# Patient Record
Sex: FEMALE | Race: WHITE | URBAN - METROPOLITAN AREA
[De-identification: names, ages, dates, MRNs, and addresses within clinical notes are randomized per-mention and may not be internally consistent; named-entity substitution may affect disease eponyms.]

---

## 2019-06-04 ENCOUNTER — OFFICE VISIT (OUTPATIENT)
Dept: INTERNAL MEDICINE CLINIC | Age: 79
End: 2019-06-04

## 2019-06-04 ENCOUNTER — HOSPITAL ENCOUNTER (OUTPATIENT)
Dept: LAB | Age: 79
Discharge: HOME | End: 2019-06-04
Payer: MEDICARE

## 2019-06-04 VITALS
DIASTOLIC BLOOD PRESSURE: 74 MMHG | SYSTOLIC BLOOD PRESSURE: 161 MMHG | TEMPERATURE: 98.5 F | OXYGEN SATURATION: 96 % | RESPIRATION RATE: 18 BRPM | HEART RATE: 58 BPM | WEIGHT: 120 LBS | HEIGHT: 61 IN | BODY MASS INDEX: 22.66 KG/M2

## 2019-06-04 DIAGNOSIS — C34.12 PRIMARY CANCER OF LEFT UPPER LOBE OF LUNG (HCC): ICD-10-CM

## 2019-06-04 DIAGNOSIS — M35.3 POLYMYALGIA RHEUMATICA (HCC): ICD-10-CM

## 2019-06-04 DIAGNOSIS — R79.89 ELEVATED LIVER FUNCTION TESTS: ICD-10-CM

## 2019-06-04 DIAGNOSIS — M81.0 AGE-RELATED OSTEOPOROSIS WITHOUT CURRENT PATHOLOGICAL FRACTURE: ICD-10-CM

## 2019-06-04 DIAGNOSIS — R79.89 ELEVATED LIVER FUNCTION TESTS: Primary | ICD-10-CM

## 2019-06-04 DIAGNOSIS — J45.20 MILD INTERMITTENT ASTHMA, UNSPECIFIED WHETHER COMPLICATED: ICD-10-CM

## 2019-06-04 DIAGNOSIS — J01.10 ACUTE NON-RECURRENT FRONTAL SINUSITIS: ICD-10-CM

## 2019-06-04 DIAGNOSIS — H40.1131 PRIMARY OPEN ANGLE GLAUCOMA (POAG) OF BOTH EYES, MILD STAGE: ICD-10-CM

## 2019-06-04 DIAGNOSIS — F33.1 MODERATE EPISODE OF RECURRENT MAJOR DEPRESSIVE DISORDER (HCC): ICD-10-CM

## 2019-06-04 DIAGNOSIS — I48.21 PERMANENT ATRIAL FIBRILLATION (HCC): ICD-10-CM

## 2019-06-04 PROBLEM — J45.909 ASTHMA IN ADULT: Status: ACTIVE | Noted: 2017-07-28

## 2019-06-04 PROBLEM — I48.91 A-FIB (HCC): Status: ACTIVE | Noted: 2017-07-28

## 2019-06-04 PROBLEM — N95.2 ATROPHIC VULVOVAGINITIS: Status: ACTIVE | Noted: 2017-08-29

## 2019-06-04 PROBLEM — K58.2 IRRITABLE BOWEL SYNDROME WITH BOTH CONSTIPATION AND DIARRHEA: Status: ACTIVE | Noted: 2017-07-28

## 2019-06-04 PROBLEM — I10 ESSENTIAL HYPERTENSION: Status: ACTIVE | Noted: 2019-06-04

## 2019-06-04 LAB
ALBUMIN SERPL-MCNC: 4.1 G/DL (ref 3.4–5)
ALBUMIN/GLOB SERPL: 1.3 {RATIO} (ref 0.8–1.7)
ALP SERPL-CCNC: 49 U/L (ref 45–117)
ALT SERPL-CCNC: 25 U/L (ref 13–56)
ANION GAP SERPL CALC-SCNC: 8 MMOL/L (ref 3–18)
APPEARANCE UR: CLEAR
AST SERPL-CCNC: 19 U/L (ref 15–37)
BACTERIA URNS QL MICRO: NEGATIVE /HPF
BILIRUB SERPL-MCNC: 0.4 MG/DL (ref 0.2–1)
BILIRUB UR QL: NEGATIVE
BUN SERPL-MCNC: 23 MG/DL (ref 7–18)
BUN/CREAT SERPL: 26 (ref 12–20)
CALCIUM SERPL-MCNC: 10.3 MG/DL (ref 8.5–10.1)
CHLORIDE SERPL-SCNC: 96 MMOL/L (ref 100–108)
CO2 SERPL-SCNC: 29 MMOL/L (ref 21–32)
COLOR UR: YELLOW
CREAT SERPL-MCNC: 0.88 MG/DL (ref 0.6–1.3)
EPITH CASTS URNS QL MICRO: ABNORMAL /LPF (ref 0–5)
FERRITIN SERPL-MCNC: 79 NG/ML (ref 8–388)
GLOBULIN SER CALC-MCNC: 3.1 G/DL (ref 2–4)
GLUCOSE SERPL-MCNC: 106 MG/DL (ref 74–99)
GLUCOSE UR STRIP.AUTO-MCNC: NEGATIVE MG/DL
HGB UR QL STRIP: ABNORMAL
IRON SATN MFR SERPL: 17 %
IRON SERPL-MCNC: 59 UG/DL (ref 50–175)
KETONES UR QL STRIP.AUTO: NEGATIVE MG/DL
LEUKOCYTE ESTERASE UR QL STRIP.AUTO: NEGATIVE
NITRITE UR QL STRIP.AUTO: NEGATIVE
PH UR STRIP: 5.5 [PH] (ref 5–8)
POTASSIUM SERPL-SCNC: 4.7 MMOL/L (ref 3.5–5.5)
PROT SERPL-MCNC: 7.2 G/DL (ref 6.4–8.2)
PROT UR STRIP-MCNC: NEGATIVE MG/DL
RBC #/AREA URNS HPF: ABNORMAL /HPF (ref 0–5)
SODIUM SERPL-SCNC: 133 MMOL/L (ref 136–145)
SP GR UR REFRACTOMETRY: 1.02 (ref 1–1.03)
TIBC SERPL-MCNC: 339 UG/DL (ref 250–450)
UROBILINOGEN UR QL STRIP.AUTO: 0.2 EU/DL (ref 0.2–1)
WBC URNS QL MICRO: ABNORMAL /HPF (ref 0–4)

## 2019-06-04 PROCEDURE — 83540 ASSAY OF IRON: CPT

## 2019-06-04 PROCEDURE — 82728 ASSAY OF FERRITIN: CPT

## 2019-06-04 PROCEDURE — 80053 COMPREHEN METABOLIC PANEL: CPT

## 2019-06-04 PROCEDURE — 36415 COLL VENOUS BLD VENIPUNCTURE: CPT

## 2019-06-04 PROCEDURE — 86803 HEPATITIS C AB TEST: CPT

## 2019-06-04 PROCEDURE — 81001 URINALYSIS AUTO W/SCOPE: CPT

## 2019-06-04 RX ORDER — TRAZODONE HYDROCHLORIDE 100 MG/1
100 TABLET ORAL
COMMUNITY
End: 2019-06-04 | Stop reason: SDUPTHER

## 2019-06-04 RX ORDER — MONTELUKAST SODIUM 10 MG/1
1 TABLET ORAL DAILY
COMMUNITY
Start: 2016-02-28

## 2019-06-04 RX ORDER — PREDNISONE 5 MG/1
TABLET ORAL
COMMUNITY
End: 2019-07-02 | Stop reason: DRUGHIGH

## 2019-06-04 RX ORDER — GLUCOSAMINE SULFATE 1500 MG
POWDER IN PACKET (EA) ORAL DAILY
COMMUNITY

## 2019-06-04 RX ORDER — LATANOPROST 50 UG/ML
1 SOLUTION/ DROPS OPHTHALMIC DAILY
COMMUNITY
Start: 2019-06-02

## 2019-06-04 RX ORDER — TRAZODONE HYDROCHLORIDE 100 MG/1
1 TABLET ORAL
COMMUNITY

## 2019-06-04 RX ORDER — CEFUROXIME AXETIL 500 MG/1
500 TABLET ORAL 2 TIMES DAILY
Qty: 14 TAB | Refills: 0 | Status: SHIPPED | OUTPATIENT
Start: 2019-06-04 | End: 2019-07-02

## 2019-06-04 RX ORDER — LORAZEPAM 0.5 MG/1
1 TABLET ORAL
COMMUNITY
End: 2019-08-12 | Stop reason: SDUPTHER

## 2019-06-04 RX ORDER — ZOLPIDEM TARTRATE 5 MG/1
1 TABLET ORAL
COMMUNITY
Start: 2019-04-25 | End: 2019-07-02 | Stop reason: DRUGHIGH

## 2019-06-04 RX ORDER — PREDNISONE 10 MG/1
TABLET ORAL
COMMUNITY
End: 2019-07-02 | Stop reason: DRUGHIGH

## 2019-06-04 RX ORDER — DILTIAZEM HYDROCHLORIDE 120 MG/1
1 CAPSULE, COATED, EXTENDED RELEASE ORAL
COMMUNITY
Start: 2019-05-10

## 2019-06-04 NOTE — PROGRESS NOTES
Chief Complaint   Patient presents with    Irregular Heart Beat     followed by     Sinus Infection     with yellow mucus     1. Have you been to the ER, urgent care clinic since your last visit? Hospitalized since your last visit? No    2. Have you seen or consulted any other health care providers outside of the [address] [place] [address] since your last visit? Include any pap smears or colon screening.  No

## 2019-06-04 NOTE — PROGRESS NOTES
HPI:     This patient presents to the office for transfer of medical care with extensive Epic records reviewed during this encounter. Information verified and transferred into local database. Her medications were reconciled. She has several outstanding issues:    1. Headaches with expectoration of yellow secretions in the absence of fever or chills. She has taken Tylenol and Aleve without relief. 2. PAF which is being managed by Dr. Keanu Dalton with trial of several anti-arrhythmic agents with intolerance. She is currently on Multaq and Xarelto. She is being referred to Dr. Alex Ch for catheter ablation. She is not aware of any palpitations. 3. Labile blood pressure control and has Cardizem for prn use as her BP has a tendency to drop dramatically    4. Polymyalgia rheumatica diagnosed by Dr. Ladonna Tran who has her on Prednisone 15 mg daily. She is not on ulcer prophylaxis. 5. Osteoporosis for which she is on IV Reclast and D3 supplements with last DEXA not on file. 6. Asthma which has been managed by Dr. Dilip Dias for which she is on Singulair and Proventil    7. Insomnia for which she is on Trazodone which has interaction with her anti-arrhythmic agent (QT prolongation    8. Elevated liver enzymes documented in October 2018 but not addressed and predated the use of Multaq. She had been drinking regularly at that time but has cut back considerably. Remote CT abdomen in 2015 reveled normal liver and gallbladder    9. 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 Sahil Hilliard and negative follow up CT July 2018    10. Microhematuria with prior negative CTU and cystoscopy    11. Eosinophilic esophagitis diagnosed in 2015 without records available for review    12. Lymphocytic colitis currently not under treatment    13.  Glaucoma    Past Medical History:   Diagnosis Date    Atrial fibrillation ([facility] [address]     Elevated liver function tests 6/4/2019    Essential hypertension 6/4/2019    Insomnia     Polymyalgia rheumatica (HCC)      Past Surgical History:   Procedure Laterality Date    HX ACL RECONSTRUCTION Left     HX BLADDER SUSPENSION      HX CATARACT REMOVAL      HX HYSTERECTOMY      HX OTHER SURGICAL      endoscopic sinus surgery    HX ROTATOR CUFF REPAIR Right     HX WISDOM TEETH EXTRACTION       Current Outpatient Medications   Medication Sig    dronedarone (MULTAQ) tab tablet Take 200 mg by mouth two (2) times daily (with meals).  cholecalciferol (VITAMIN D3) 1,000 unit cap Take  by mouth daily.  predniSONE (DELTASONE) 5 mg tablet Take  by mouth.  predniSONE (DELTASONE) 10 mg tablet Take  by mouth daily (with breakfast).  montelukast (SINGULAIR) 10 mg tablet Take 1 Tab by mouth daily.  dilTIAZem CD (CARDIZEM CD) 120 mg ER capsule Take 1 Cap by mouth daily as needed. palpitations    rivaroxaban (XARELTO) 20 mg tab tablet Take 1 Tab by mouth daily.  cefUROXime (CEFTIN) 500 mg tablet Take 1 Tab by mouth two (2) times a day.  traZODone (DESYREL) 100 mg tablet Take 1 Tab by mouth nightly.  zolpidem (AMBIEN) 5 mg tablet Take 1 Tab by mouth nightly as needed.  LORazepam (ATIVAN) 0.5 mg tablet Take 1 Tab by mouth nightly as needed.  latanoprost (XALATAN) 0.005 % ophthalmic solution Administer 1 Drop to both eyes daily.  albuterol sulfate (PROAIR RESPICLICK) 90 mcg/actuation aepb Take 2 Puffs by inhalation every four to six (4-6) hours as needed. No current facility-administered medications for this visit. Allergies and Intolerances:    Allergies   Allergen Reactions    Doxycycline Hives    Codeine Nausea and Vomiting and Hives     Can use tussionex  Only allergic to the syrup      Timolol Hives     Family History:   Family History   Problem Relation Age of Onset    Cancer Mother     Colon Cancer Mother     Stroke Father     Diabetes Father     Cancer Sister     Stroke Brother     Diabetes Brother     Parkinson's Disease Brother     No Known Problems Brother      Social History:   She  reports that she has never smoked. She has never used smokeless tobacco.   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 Negative for chills and fever. HENT: Negative for hearing loss. Eyes: Negative for blurred vision. Respiratory: Negative for shortness of breath and wheezing. Cardiovascular: Negative for leg swelling. Gastrointestinal: Negative for heartburn. Genitourinary: Negative for dysuria. Neurological: Positive for headaches. Psychiatric/Behavioral: The patient has insomnia. Physical:   Visit Vitals  /74 (BP 1 Location: Left arm, BP Patient Position: Sitting)   Pulse (!) 58   Temp 98.5 °F (36.9 °C) (Oral)   Resp 18   Ht 5' 4\" (1.626 m)   Wt 120 lb (54.4 kg)   SpO2 96%   BMI 20.60 kg/m²          Physical Exam   Constitutional: She is well-developed, well-nourished, and in no distress. Pleasant soft spoken lady with delicacy of manners   HENT:   Right Ear: External ear normal.   Left Ear: External ear normal.   Mouth/Throat: Oropharynx is clear and moist.   Eyes: Conjunctivae are normal. No scleral icterus. Neck: No JVD present. Cardiovascular: Normal rate, regular rhythm, normal heart sounds and intact distal pulses. Exam reveals no gallop. No murmur heard. Pulmonary/Chest: Breath sounds normal. She has no wheezes. She has no rales. Abdominal: Soft. Bowel sounds are normal. She exhibits no mass. There is no tenderness. Musculoskeletal: She exhibits no edema. Lymphadenopathy:     She has no cervical adenopathy. Neurological: She is alert. She displays no tremor. Gait normal.   Skin: No cyanosis. Nails show no clubbing. Psychiatric: Affect normal.   Vitals reviewed. Review of Data:  Labs:  No visits with results within 3 Month(s) from this visit. Latest known visit with results is:   No results found for any previous visit.      Impression/Plan:   Patient Active Problem List   Diagnosis  Code    PAF, non-valvular, with intolerance to anti-arrhythmic Defer management to Dr. Marissa Maria and alert him to abnormal liver function as Multaq can lead to DILI (drug induced liver injury)   I48.91    Hypertension with labile control Monitor BP trend at home, consider change from Trazodone to Lexapro   I10    PMR on chronic steroid Steroid taper per Dr. Doreen Del Castillo and recommend Omeprazole 20mg daily for ulcer prophylaxis   M35.3    Elevated liver enzymes with concern for drug mediated effect Repeat liver panel with hepatitis serology, transferrin saturation ordered, imaging if LFT persistently elevated   R94.5    Hyeprlipidemia Determine reason she is not on statin, repeat FLP and calculate ACC/AHA risk score   E78,5    NSCLC Stage 1A s/p thorascopic wedge resection   Surveillance per Dr. Darvin Pearson C34.12    Age-related osteoporosis without current pathological fracture   Management per Dr. Doreen Del Castillo M81.0    Allergic rhinosinusitis with recent flare Ceftin 500 mg bid x 7 days, Saline NS   J30.9    Asthma in adult, stable Continue current regimen   J45.909    Glaucoma Management per Ophthalmology   H40.9    Lymphocytic Colitis Determine from GI if treatment recommended   G69.8    Eosinophilic esophagitis Determine from GI if treatment recommended (ICS) or food allergy testing   I48.91    Primary insomnia Consider alternative to Trazodone F51.01       Jenna Shearer MD

## 2019-06-05 LAB
HCV AB SER IA-ACNC: <0.02 INDEX
HCV AB SERPL QL IA: NEGATIVE
HCV COMMENT,HCGAC: NORMAL

## 2019-07-02 ENCOUNTER — OFFICE VISIT (OUTPATIENT)
Dept: INTERNAL MEDICINE CLINIC | Age: 79
End: 2019-07-02

## 2019-07-02 ENCOUNTER — HOSPITAL ENCOUNTER (OUTPATIENT)
Dept: LAB | Age: 79
Discharge: HOME | End: 2019-07-02
Payer: MEDICARE

## 2019-07-02 VITALS
TEMPERATURE: 98.8 F | HEART RATE: 86 BPM | DIASTOLIC BLOOD PRESSURE: 71 MMHG | RESPIRATION RATE: 18 BRPM | WEIGHT: 121 LBS | BODY MASS INDEX: 22.84 KG/M2 | HEIGHT: 61 IN | OXYGEN SATURATION: 97 % | SYSTOLIC BLOOD PRESSURE: 128 MMHG

## 2019-07-02 DIAGNOSIS — R73.03 PREDIABETES: ICD-10-CM

## 2019-07-02 DIAGNOSIS — M31.6 GIANT CELL ARTERITIS (HCC): ICD-10-CM

## 2019-07-02 DIAGNOSIS — R73.03 PREDIABETES: Primary | ICD-10-CM

## 2019-07-02 DIAGNOSIS — E83.52 HYPERCALCEMIA: ICD-10-CM

## 2019-07-02 PROBLEM — E87.1 HYPONATREMIA: Status: ACTIVE | Noted: 2019-07-02

## 2019-07-02 LAB
ANION GAP SERPL CALC-SCNC: 6 MMOL/L (ref 3–18)
BUN SERPL-MCNC: 27 MG/DL (ref 7–18)
BUN/CREAT SERPL: 27 (ref 12–20)
CALCIUM SERPL-MCNC: 9.4 MG/DL (ref 8.5–10.1)
CALCIUM SERPL-MCNC: 9.4 MG/DL (ref 8.5–10.1)
CHLORIDE SERPL-SCNC: 97 MMOL/L (ref 100–108)
CO2 SERPL-SCNC: 30 MMOL/L (ref 21–32)
CREAT SERPL-MCNC: 1.01 MG/DL (ref 0.6–1.3)
EST. AVERAGE GLUCOSE BLD GHB EST-MCNC: 120 MG/DL
GLUCOSE SERPL-MCNC: 132 MG/DL (ref 74–99)
HBA1C MFR BLD: 5.8 % (ref 4.2–5.6)
POTASSIUM SERPL-SCNC: 5 MMOL/L (ref 3.5–5.5)
PTH-INTACT SERPL-MCNC: 57.8 PG/ML (ref 18.4–88)
SODIUM SERPL-SCNC: 133 MMOL/L (ref 136–145)

## 2019-07-02 PROCEDURE — 36415 COLL VENOUS BLD VENIPUNCTURE: CPT

## 2019-07-02 PROCEDURE — 83970 ASSAY OF PARATHORMONE: CPT

## 2019-07-02 PROCEDURE — 80048 BASIC METABOLIC PNL TOTAL CA: CPT

## 2019-07-02 PROCEDURE — 83036 HEMOGLOBIN GLYCOSYLATED A1C: CPT

## 2019-07-02 RX ORDER — ZOLPIDEM TARTRATE 12.5 MG/1
12.5 TABLET, FILM COATED, EXTENDED RELEASE ORAL
Qty: 30 TAB | Refills: 0 | Status: SHIPPED | OUTPATIENT
Start: 2019-07-02

## 2019-07-02 RX ORDER — PREDNISONE 20 MG/1
TABLET ORAL
Qty: 90 TAB | Refills: 0
Start: 2019-07-02

## 2019-07-02 RX ORDER — PHENOL/SODIUM PHENOLATE
20 AEROSOL, SPRAY (ML) MUCOUS MEMBRANE DAILY
Qty: 30 TAB | Refills: 5 | Status: SHIPPED | OUTPATIENT
Start: 2019-07-02

## 2019-07-02 NOTE — PROGRESS NOTES
HPI:     This al[place] lady had temporal artery biopsy performed in [place] with GCA diagnosis confirmed. This was arranged by Dr. Flo Escobar who has since increased her Prednisone to 60 mg daily and will be starting Actemra in an effort to get off steroid therapy. Her Quantiferon gold testing was apparently negative. She is not on ulcer prophylaxis. She also complains of problems with agitation and insomnia since starting higher dose of steroid and Ambien combine with Trazodone has only allowed 2 hours of sleep and desperately wants relief. She denies any AM hangover effect. I repeated her liver function studies which returned normal along with iron panel and Hep C screening. I do not have results of liver imaging which have been ordered to determine if she has fatty liver. I also made her aware that she had mild hyponatremia and hypercalcemia but is not on diuretic therapy. She denies use of any tea or other caffeinated beverage that could explain these results. She is not taking calcium supplements. Past Medical History:   Diagnosis Date    Atrial fibrillation ([address]     Elevated liver function tests 6/4/2019    Essential hypertension 6/4/2019    Insomnia     Polymyalgia rheumatica (HCC)      Past Surgical History:   Procedure Laterality Date    HX ACL RECONSTRUCTION Left     HX BLADDER SUSPENSION      HX CATARACT REMOVAL      HX HYSTERECTOMY      HX OTHER SURGICAL      endoscopic sinus surgery    HX ROTATOR CUFF REPAIR Right     HX WISDOM TEETH EXTRACTION       Current Outpatient Medications   Medication Sig    predniSONE (DELTASONE) 20 mg tablet 3 tabs po every day for GCA per Dr. Flo Carpenterist  Indications: Inflammation of the Artery in the [facility]    zolpidem CR (AMBIEN CR) 12.5 mg tablet Take 1 Tab by mouth nightly as needed for Sleep. Max Daily Amount: 12.5 mg.    Omeprazole delayed release (PRILOSEC D/R) 20 mg tablet Take 1 Tab by mouth daily.     cholecalciferol (VITAMIN D3) 1,000 unit cap Take  by mouth daily.  traZODone (DESYREL) 100 mg tablet Take 1 Tab by mouth nightly.  montelukast (SINGULAIR) 10 mg tablet Take 1 Tab by mouth daily.  LORazepam (ATIVAN) 0.5 mg tablet Take 1 Tab by mouth nightly as needed.  latanoprost (XALATAN) 0.005 % ophthalmic solution Administer 1 Drop to both eyes daily.  dilTIAZem CD (CARDIZEM CD) 120 mg ER capsule Take 1 Cap by mouth daily as needed. palpitations    albuterol sulfate (PROAIR RESPICLICK) 90 mcg/actuation aepb Take 2 Puffs by inhalation every four to six (4-6) hours as needed.  rivaroxaban (XARELTO) 20 mg tab tablet Take 1 Tab by mouth daily. No current facility-administered medications for this visit. Allergies and Intolerances: Allergies   Allergen Reactions    Doxycycline Hives    Codeine Nausea and Vomiting and Hives     Can use tussionex  Only allergic to the syrup      Multaq [Dronedarone] Other (comments)     Profound lowering heart rate    Timolol Hives     Family History:   Family History   Problem Relation Age of Onset    Cancer Mother     Colon Cancer Mother     Stroke Father     Diabetes Father     Cancer Sister     Stroke Brother     Diabetes Brother     Parkinson's Disease Brother     No Known Problems Brother      Social History:   She  reports that she has never smoked. She has never used smokeless tobacco.   Social History     Substance and Sexual Activity   Alcohol Use Yes    Alcohol/week: 0.6 oz    Types: 1 Cans of beer per week    Comment: corona light per day       Physical:   Visit Vitals  /71 (BP 1 Location: Left arm, BP Patient Position: Sitting)   Pulse 86   Temp 98.8 °F (37.1 °C) (Oral)   Resp 18   Ht 5' 1\" (1.549 m)   Wt 121 lb (54.9 kg)   SpO2 97%   BMI 22.86 kg/m²          Physical Exam   Constitutional: She is well-developed, well-nourished, and in no distress.    HENT:   Mouth/Throat: Oropharynx is clear and moist.   Healing incisions from bilateral temporal artery biopsies Eyes: Conjunctivae are normal. No scleral icterus. Cardiovascular: Normal rate, regular rhythm and normal heart sounds. Pulmonary/Chest: Breath sounds normal.   Abdominal: Soft. There is no tenderness. Musculoskeletal: She exhibits no edema. Lymphadenopathy:     She has no cervical adenopathy. Vitals reviewed. Review of Data:  Labs:  Hospital Outpatient Visit on 06/04/2019   Component Date Value Ref Range Status    Sodium 06/04/2019 133* 136 - 145 mmol/L Final    Potassium 06/04/2019 4.7  3.5 - 5.5 mmol/L Final    Chloride 06/04/2019 96* 100 - 108 mmol/L Final    CO2 06/04/2019 29  21 - 32 mmol/L Final    Anion gap 06/04/2019 8  3.0 - 18 mmol/L Final    Glucose 06/04/2019 106* 74 - 99 mg/dL Final    BUN 06/04/2019 23* 7.0 - 18 MG/DL Final    Creatinine 06/04/2019 0.88  0.6 - 1.3 MG/DL Final    BUN/Creatinine ratio 06/04/2019 26* 12 - 20   Final    GFR est AA 06/04/2019 >60  >60 ml/min/1.73m2 Final    GFR est non-AA 06/04/2019 >60  >60 ml/min/1.73m2 Final    Calcium 06/04/2019 10.3* 8.5 - 10.1 MG/DL Final    Bilirubin, total 06/04/2019 0.4  0.2 - 1.0 MG/DL Final    ALT (SGPT) 06/04/2019 25  13 - 56 U/L Final    AST (SGOT) 06/04/2019 19  15 - 37 U/L Final    Alk.  phosphatase 06/04/2019 49  45 - 117 U/L Final    Protein, total 06/04/2019 7.2  6.4 - 8.2 g/dL Final    Albumin 06/04/2019 4.1  3.4 - 5.0 g/dL Final    Globulin 06/04/2019 3.1  2.0 - 4.0 g/dL Final    A-G Ratio 06/04/2019 1.3  0.8 - 1.7   Final    Color 06/04/2019 YELLOW    Final    Appearance 06/04/2019 CLEAR    Final    Specific gravity 06/04/2019 1.016  1.005 - 1.030   Final    pH (UA) 06/04/2019 5.5  5.0 - 8.0   Final    Protein 06/04/2019 NEGATIVE   NEG mg/dL Final    Glucose 06/04/2019 NEGATIVE   NEG mg/dL Final    Ketone 06/04/2019 NEGATIVE   NEG mg/dL Final    Bilirubin 06/04/2019 NEGATIVE   NEG   Final    Blood 06/04/2019 MODERATE* NEG   Final    Urobilinogen 06/04/2019 0.2  0.2 - 1.0 EU/dL Final    Nitrites 06/04/2019 NEGATIVE   NEG   Final    Leukocyte Esterase 06/04/2019 NEGATIVE   NEG   Final    WBC 06/04/2019 0 to 3  0 - 4 /hpf Final    RBC 06/04/2019 2 to 4  0 - 5 /hpf Final    Epithelial cells 06/04/2019 FEW  0 - 5 /lpf Final    Bacteria 06/04/2019 NEGATIVE   NEG /hpf Final    Hepatitis C virus Ab 06/04/2019 <0.02  <0.80 Index Final    Hep C  virus Ab Interp.  06/04/2019 NEGATIVE   NEG   Final    Hep C  virus Ab comment 06/04/2019        Final    Iron 06/04/2019 59  50 - 175 ug/dL Final    TIBC 06/04/2019 339  250 - 450 ug/dL Final    Iron % saturation 06/04/2019 17  % Final    Ferritin 06/04/2019 79  8 - 388 NG/ML Final       Impression/Plan:   Patient Active Problem List   Diagnosis  Code    GCA/PMR   Management per Dr. Pacheco Iglesias, aware of risks/complications of long term steroid use   M31.6    Insomnia and mood lability from steroid     Change to Ambien CR 12.5mg qhs F51.01    Hyponatremia with concern for SIADH   Repeat BMP ordered E87.1    Hypercalcemia   Repeat calcium with PTH ordered E83.52    Elevated liver enzymes, resolved, likely medication mediated Obtain copy of liver imaging R94.5       Tomas Manjarrez MD

## 2019-07-02 NOTE — PATIENT INSTRUCTIONS
GCA>>Prednisone 60 mg taper and Actemra to be started (caution regarding increased risk for infection for both, ulcers for the former, weight gain with former and agitation and mood lability with the former). Insomnia>>Ambien CR 12.5mg at bedtime with caution combining with Trazodone Mild low sodium and chloride and mild elevation of calcium>>repeat today Ulcer prophylaxis>>Omeprazole

## 2019-07-02 NOTE — PROGRESS NOTES
Chief Complaint   Patient presents with    Follow-up     labs     1. Have you been to the ER, urgent care clinic since your last visit? Hospitalized since your last visit? No    2. Have you seen or consulted any other health care providers outside of the [address] [place] [address] since your last visit? Include any pap smears or colon screening.  No

## 2019-07-03 ENCOUNTER — TELEPHONE (OUTPATIENT)
Dept: INTERNAL MEDICINE CLINIC | Age: 79
End: 2019-07-03

## 2019-08-12 ENCOUNTER — OFFICE VISIT (OUTPATIENT)
Dept: INTERNAL MEDICINE CLINIC | Age: 79
End: 2019-08-12

## 2019-08-12 ENCOUNTER — HOSPITAL ENCOUNTER (OUTPATIENT)
Dept: LAB | Age: 79
Discharge: HOME | End: 2019-08-12
Payer: MEDICARE

## 2019-08-12 VITALS
HEIGHT: 61 IN | TEMPERATURE: 97.6 F | OXYGEN SATURATION: 98 % | BODY MASS INDEX: 22.84 KG/M2 | SYSTOLIC BLOOD PRESSURE: 110 MMHG | HEART RATE: 85 BPM | DIASTOLIC BLOOD PRESSURE: 66 MMHG | RESPIRATION RATE: 18 BRPM | WEIGHT: 121 LBS

## 2019-08-12 DIAGNOSIS — M81.0 AGE-RELATED OSTEOPOROSIS WITHOUT CURRENT PATHOLOGICAL FRACTURE: ICD-10-CM

## 2019-08-12 DIAGNOSIS — G60.9 IDIOPATHIC PERIPHERAL NEUROPATHY: ICD-10-CM

## 2019-08-12 DIAGNOSIS — B00.89 HERPETIC DERMATITIS: Primary | ICD-10-CM

## 2019-08-12 DIAGNOSIS — B00.89 HERPETIC DERMATITIS: ICD-10-CM

## 2019-08-12 DIAGNOSIS — M79.18 DIFFUSE AMPLIFIED MUSCULOSKELETAL PAIN SYNDROME: ICD-10-CM

## 2019-08-12 DIAGNOSIS — F41.9 ANXIETY: ICD-10-CM

## 2019-08-12 DIAGNOSIS — M35.3 POLYMYALGIA RHEUMATICA (HCC): ICD-10-CM

## 2019-08-12 PROBLEM — F41.8 DEPRESSION WITH ANXIETY: Status: ACTIVE | Noted: 2019-08-12

## 2019-08-12 PROBLEM — G31.84 COGNITIVE IMPAIRMENT, MILD, SO STATED: Status: ACTIVE | Noted: 2019-08-12

## 2019-08-12 LAB — TSH SERPL DL<=0.05 MIU/L-ACNC: 0.54 UIU/ML (ref 0.36–3.74)

## 2019-08-12 PROCEDURE — 87641 MR-STAPH DNA AMP PROBE: CPT

## 2019-08-12 PROCEDURE — 84155 ASSAY OF PROTEIN SERUM: CPT

## 2019-08-12 PROCEDURE — 86038 ANTINUCLEAR ANTIBODIES: CPT

## 2019-08-12 PROCEDURE — 36415 COLL VENOUS BLD VENIPUNCTURE: CPT

## 2019-08-12 PROCEDURE — 84443 ASSAY THYROID STIM HORMONE: CPT

## 2019-08-12 PROCEDURE — 87186 SC STD MICRODIL/AGAR DIL: CPT

## 2019-08-12 PROCEDURE — 86618 LYME DISEASE ANTIBODY: CPT

## 2019-08-12 PROCEDURE — 87077 CULTURE AEROBIC IDENTIFY: CPT

## 2019-08-12 PROCEDURE — 87081 CULTURE SCREEN ONLY: CPT

## 2019-08-12 PROCEDURE — 82607 VITAMIN B-12: CPT

## 2019-08-12 RX ORDER — TRAMADOL HYDROCHLORIDE 50 MG/1
50 TABLET ORAL
Qty: 28 TAB | Refills: 0 | Status: SHIPPED | OUTPATIENT
Start: 2019-08-12 | End: 2019-08-19

## 2019-08-12 RX ORDER — VALACYCLOVIR HYDROCHLORIDE 500 MG/1
500 TABLET, FILM COATED ORAL 2 TIMES DAILY
Qty: 14 TAB | Refills: 1 | Status: SHIPPED | OUTPATIENT
Start: 2019-08-12 | End: 2019-08-19

## 2019-08-12 RX ORDER — LORAZEPAM 0.5 MG/1
0.5 TABLET ORAL
Qty: 30 TAB | Refills: 5 | Status: SHIPPED | OUTPATIENT
Start: 2019-08-12

## 2019-08-12 NOTE — PROGRESS NOTES
HPI:     This patient presents to the office with a typewritten list of concerns that she would like addressed during this visit:    1. She has developed numbness and tingling in the bottom of her feet and is concerned she has peripheral neuropathy and wanted to exclude reversible etiologies. 2. She wants to be assessed for cognitive impairment because of short term memory loss. She is aware that use of benzodiazepines can cause this condition. She is still requesting refill of her Lorazepam for prn use. 3. She is requesting Tramadol for pain relief. She reports history of multiple fractures of scapula and ribs from a remote injury. She has been using leftover hydrocodone prescribed years prior. 4. She is wondering if she can resume Lexapro as she is no longer on Flecainide. This unfortunately has a major interaction with Trazodone and can lead to serotonin syndrome. 5. She wants to know if she would benefit from catheter ablation for her AF which had been discussed with her by Dr. Dunia Crews and for that reason has been referred to Dr. Sophia Harris    6. She has developed painful lesions along her right buttock since developing diarrhea. This is apparently a recurring rash. She has used topical antibiotic without relief. 7. She has chronic exertional dyspnea and has a pending appointment with Dr. Kenneth Arnold. She carries a diagnosis of asthma but is not on maintenance MDI and has valvular heart disease (AR/MR) but last echo not on file to determine severity and if there is complicating pulmonary hypertension. 8. Her PMR has flared and has been instructed by Dr. Eleonora Walter to increase her Prednisone to 55mg daily and to reduce this to 50mg after a week and provide him with feedback thereafter. I do not have any consultation notes for review to determine if steroid sparing agent is being considered.     Past Medical History:   Diagnosis Date    Atrial fibrillation (HCC)     Elevated liver function tests 6/4/2019    Essential hypertension 6/4/2019    Insomnia     Polymyalgia rheumatica (HCC)      Past Surgical History:   Procedure Laterality Date    HX ACL RECONSTRUCTION Left     HX BLADDER SUSPENSION      HX CATARACT REMOVAL      HX HYSTERECTOMY      HX OTHER SURGICAL      endoscopic sinus surgery    HX ROTATOR CUFF REPAIR Right     HX WISDOM TEETH EXTRACTION       Current Outpatient Medications   Medication Sig    LORazepam (ATIVAN) 0.5 mg tablet Take 1 Tab by mouth nightly as needed for Anxiety. Max Daily Amount: 0.5 mg.    traMADol (ULTRAM) 50 mg tablet Take 1 Tab by mouth every six (6) hours as needed for Pain for up to 7 days. Max Daily Amount: 200 mg.    valACYclovir (VALTREX) 500 mg tablet Take 1 Tab by mouth two (2) times a day for 7 days.  predniSONE (DELTASONE) 20 mg tablet 3 tabs po every day for GCA per Dr. Susana Weiss  Indications: Inflammation of the Artery in the [facility]    zolpidem CR (AMBIEN CR) 12.5 mg tablet Take 1 Tab by mouth nightly as needed for Sleep. Max Daily Amount: 12.5 mg.    Omeprazole delayed release (PRILOSEC D/R) 20 mg tablet Take 1 Tab by mouth daily.  cholecalciferol (VITAMIN D3) 1,000 unit cap Take  by mouth daily.  traZODone (DESYREL) 100 mg tablet Take 1 Tab by mouth nightly.  montelukast (SINGULAIR) 10 mg tablet Take 1 Tab by mouth daily.  latanoprost (XALATAN) 0.005 % ophthalmic solution Administer 1 Drop to both eyes daily.  dilTIAZem CD (CARDIZEM CD) 120 mg ER capsule Take 1 Cap by mouth daily as needed. palpitations    albuterol sulfate (PROAIR RESPICLICK) 90 mcg/actuation aepb Take 2 Puffs by inhalation every four to six (4-6) hours as needed.  rivaroxaban (XARELTO) 20 mg tab tablet Take 1 Tab by mouth daily. No current facility-administered medications for this visit. Allergies and Intolerances:    Allergies   Allergen Reactions    Doxycycline Hives    Codeine Nausea and Vomiting and Hives     Can use tussionex  Only allergic to the syrup      Multaq [Dronedarone] Other (comments)     Profound lowering heart rate    Timolol Hives     Family History:   Family History   Problem Relation Age of Onset    Cancer Mother     Colon Cancer Mother     Stroke Father     Diabetes Father     Cancer Sister     Stroke Brother     Diabetes Brother     Parkinson's Disease Brother     No Known Problems Brother      Social History:   She  reports that she has never smoked. She has never used smokeless tobacco.   Social History     Substance and Sexual Activity   Alcohol Use Yes    Alcohol/week: 1.0 standard drinks    Types: 1 Cans of beer per week    Comment: corona light per day         Review of Systems   Constitutional: Positive for malaise/fatigue. HENT: Negative for hearing loss. Eyes: Negative for blurred vision. Respiratory: Positive for shortness of breath. Cardiovascular: Negative for chest pain and palpitations. Gastrointestinal: Negative for heartburn. Genitourinary: Negative for dysuria. Musculoskeletal: Positive for joint pain and myalgias. Skin: Positive for rash. Neurological: Negative for tremors. Psychiatric/Behavioral: Positive for depression and memory loss. The patient is nervous/anxious. Physical:   Visit Vitals  /66   Pulse 85   Temp 97.6 °F (36.4 °C) (Oral)   Resp 18   Ht 5' 1\" (1.549 m)   Wt 121 lb (54.9 kg)   SpO2 98%   BMI 22.86 kg/m²          Physical Exam   Constitutional:   Anxious tearful lady with a cane who was recumbent on exam table when I entered   HENT:   Mouth/Throat: Oropharynx is clear and moist. No oropharyngeal exudate. Eyes: Conjunctivae are normal. No scleral icterus. Neck: No JVD present. Cardiovascular: Normal rate and normal pulses. An irregularly irregular rhythm present. Exam reveals no S3. No murmur heard. Pulmonary/Chest: Breath sounds normal.   Abdominal: Soft. Bowel sounds are normal. There is no tenderness. Musculoskeletal: She exhibits no edema. Lymphadenopathy:     She has no cervical adenopathy. Neurological: She is alert. She has normal sensation and intact cranial nerves. She displays no tremor. No sensory deficit. Reflex Scores:       Tricep reflexes are 1+ on the right side and 1+ on the left side. Bicep reflexes are 1+ on the right side and 1+ on the left side. Brachioradialis reflexes are 1+ on the right side and 1+ on the left side. Patellar reflexes are 1+ on the right side and 1+ on the left side. Achilles reflexes are 1+ on the right side. MS 4+/5+ all extremities  MMSE 30/30   Skin:        Vitals reviewed.      Review of Data:  Labs:  Hospital Outpatient Visit on 07/02/2019   Component Date Value Ref Range Status    Hemoglobin A1c 07/02/2019 5.8* 4.2 - 5.6 % Final    Est. average glucose 07/02/2019 120  mg/dL Final    Sodium 07/02/2019 133* 136 - 145 mmol/L Final    Potassium 07/02/2019 5.0  3.5 - 5.5 mmol/L Final    Chloride 07/02/2019 97* 100 - 108 mmol/L Final    CO2 07/02/2019 30  21 - 32 mmol/L Final    Anion gap 07/02/2019 6  3.0 - 18 mmol/L Final    Glucose 07/02/2019 132* 74 - 99 mg/dL Final    BUN 07/02/2019 27* 7.0 - 18 MG/DL Final    Creatinine 07/02/2019 1.01  0.6 - 1.3 MG/DL Final    BUN/Creatinine ratio 07/02/2019 27* 12 - 20   Final    GFR est AA 07/02/2019 >60  >60 ml/min/1.73m2 Final    GFR est non-AA 07/02/2019 53* >60 ml/min/1.73m2 Final    Calcium 07/02/2019 9.4  8.5 - 10.1 MG/DL Final    Calcium 07/02/2019 9.4  8.5 - 10.1 MG/DL Final    PTH, Intact 07/02/2019 57.8  18.4 - 88.0 pg/mL Final   Hospital Outpatient Visit on 06/04/2019   Component Date Value Ref Range Status    Sodium 06/04/2019 133* 136 - 145 mmol/L Final    Potassium 06/04/2019 4.7  3.5 - 5.5 mmol/L Final    Chloride 06/04/2019 96* 100 - 108 mmol/L Final    CO2 06/04/2019 29  21 - 32 mmol/L Final    Anion gap 06/04/2019 8  3.0 - 18 mmol/L Final    Glucose 06/04/2019 106* 74 - 99 mg/dL Final    BUN 06/04/2019 23* 7.0 - 18 MG/DL Final    Creatinine 06/04/2019 0.88  0.6 - 1.3 MG/DL Final    BUN/Creatinine ratio 06/04/2019 26* 12 - 20   Final    GFR est AA 06/04/2019 >60  >60 ml/min/1.73m2 Final    GFR est non-AA 06/04/2019 >60  >60 ml/min/1.73m2 Final    Calcium 06/04/2019 10.3* 8.5 - 10.1 MG/DL Final    Bilirubin, total 06/04/2019 0.4  0.2 - 1.0 MG/DL Final    ALT (SGPT) 06/04/2019 25  13 - 56 U/L Final    AST (SGOT) 06/04/2019 19  15 - 37 U/L Final    Alk. phosphatase 06/04/2019 49  45 - 117 U/L Final    Protein, total 06/04/2019 7.2  6.4 - 8.2 g/dL Final    Albumin 06/04/2019 4.1  3.4 - 5.0 g/dL Final    Globulin 06/04/2019 3.1  2.0 - 4.0 g/dL Final    A-G Ratio 06/04/2019 1.3  0.8 - 1.7   Final    Color 06/04/2019 YELLOW    Final    Appearance 06/04/2019 CLEAR    Final    Specific gravity 06/04/2019 1.016  1.005 - 1.030   Final    pH (UA) 06/04/2019 5.5  5.0 - 8.0   Final    Protein 06/04/2019 NEGATIVE   NEG mg/dL Final    Glucose 06/04/2019 NEGATIVE   NEG mg/dL Final    Ketone 06/04/2019 NEGATIVE   NEG mg/dL Final    Bilirubin 06/04/2019 NEGATIVE   NEG   Final    Blood 06/04/2019 MODERATE* NEG   Final    Urobilinogen 06/04/2019 0.2  0.2 - 1.0 EU/dL Final    Nitrites 06/04/2019 NEGATIVE   NEG   Final    Leukocyte Esterase 06/04/2019 NEGATIVE   NEG   Final    WBC 06/04/2019 0 to 3  0 - 4 /hpf Final    RBC 06/04/2019 2 to 4  0 - 5 /hpf Final    Epithelial cells 06/04/2019 FEW  0 - 5 /lpf Final    Bacteria 06/04/2019 NEGATIVE   NEG /hpf Final    Hepatitis C virus Ab 06/04/2019 <0.02  <0.80 Index Final    Hep C  virus Ab Interp.  06/04/2019 NEGATIVE   NEG   Final    Hep C  virus Ab comment 06/04/2019        Final    Iron 06/04/2019 59  50 - 175 ug/dL Final    TIBC 06/04/2019 339  250 - 450 ug/dL Final    Iron % saturation 06/04/2019 17  % Final    Ferritin 06/04/2019 79  8 - 388 NG/ML Final       Impression/Plan:   Patient Active Problem List   Diagnosis  Code    PMR with flare on high dose steroid Management per Dr. Ugalde File including consideration for steroid sparing agent, ulcer prophylaxis in place, DEXA through Dr. Ugalde File, aware of side effect of long term steroid agent   M35.3    Diffuse widespread pain from prior traumatic injuries (self described) Tramadol 50mg bid prn and warned that this is an opioid and subject to state and federal regulations   M79.18    Depression/Anxiety with flare Determine if she has been evaluated by Psychiatry, aware that SSRI cannot be prescribed in setting of TCA because of risk of serotonin syndrome, refill Lorazepam   F41.8    Self described cognitive dysfunction not objectified Consider neuropsychological consultation   G31.84    Peripheral neuropathy by description Arrange EMG/NCV through Dr. Estella Ross, lab panel ordered   G60.9    Exertional dyspnea likely on basis of AF (loss of atrial kick), deconditioning, RAD Obtain records from Johan Benavidez and Mariely Whittington including PFT and echocardiogram   M81.0    A-fib ([address] with CHADSVASC score 4 Determine if NOAC was considered by Dr. Jackie Benavidez, proceed with evaluation by Dr. Kylee Timmons   I48.91    Essential hypertension controlled Continue current regimen   I10    Elevated liver function tests normalized off Multaq Repeat liver panel next visit   R94.5    Painful rash right buttock likely herpetic dermatitis Valtrex 500mg bid x 7 days, dermatology consultation for [place] culture if not resolved B00.89             Adrien Norris MD

## 2019-08-12 NOTE — PROGRESS NOTES
Chief Complaint   Patient presents with    Hypertension    Follow-up     1 month f/u     1. Have you been to the ER, urgent care clinic since your last visit? Hospitalized since your last visit? No    2. Have you seen or consulted any other health care providers outside of the [address] [place] [address] since your last visit? Include any pap smears or colon screening.  No

## 2019-08-12 NOTE — PATIENT INSTRUCTIONS
Painful lesions buttock>>?herpetic>>Valtrex Depresssion>>CANNOT restart Lexapro because of interaction with Trazodone Pain related to fractures>>Tramadol twice a day as needed DO NOT Trenerys [address] Anxiety>>Ativan prn 
 
Memory>>no evidence for impairment Neuropathy>>screening labs + NCV study with Dr. Leia Clifford Asthma stable>>Dr. Vita Wren 
 
AF>>Dr. Marino Polk Aortic and Mitral Regurgitation>>Dr. Seymour Strauss Shortness of breath from deconditioning and AF and asthma

## 2019-08-13 LAB
ANA TITR SER IF: NEGATIVE {TITER}
VIT B12 SERPL-MCNC: 287 PG/ML (ref 211–911)

## 2019-08-14 LAB
ALBUMIN SERPL ELPH-MCNC: 3.7 G/DL (ref 2.9–4.4)
ALBUMIN/GLOB SERPL: 1.5 {RATIO} (ref 0.7–1.7)
ALPHA1 GLOB SERPL ELPH-MCNC: 0.2 G/DL (ref 0–0.4)
ALPHA2 GLOB SERPL ELPH-MCNC: 0.6 G/DL (ref 0.4–1)
B BURGDOR IGG+IGM SER-ACNC: <0.91 ISR (ref 0–0.9)
B-GLOBULIN SERPL ELPH-MCNC: 1 G/DL (ref 0.7–1.3)
GAMMA GLOB SERPL ELPH-MCNC: 0.6 G/DL (ref 0.4–1.8)
GLOBULIN SER CALC-MCNC: 2.4 G/DL (ref 2.2–3.9)
M PROTEIN SERPL ELPH-MCNC: NORMAL G/DL
PROT SERPL-MCNC: 6.1 G/DL (ref 6–8.5)

## 2019-08-15 LAB
BACTERIA SPEC CULT: ABNORMAL
SERVICE CMNT-IMP: ABNORMAL

## 2019-08-19 DIAGNOSIS — Z22.322 MRSA CARRIER: Primary | ICD-10-CM

## 2019-08-19 RX ORDER — CHLORHEXIDINE GLUCONATE 4 G/100ML
SOLUTION TOPICAL
Qty: 1 BOTTLE | Refills: 1 | Status: SHIPPED | OUTPATIENT
Start: 2019-08-19

## 2019-08-20 ENCOUNTER — TELEPHONE (OUTPATIENT)
Dept: INTERNAL MEDICINE CLINIC | Age: 79
End: 2019-08-20

## 2019-08-20 RX ORDER — MUPIROCIN 20 MG/G
OINTMENT TOPICAL 2 TIMES DAILY
Qty: 22 G | Refills: 0 | Status: SHIPPED | OUTPATIENT
Start: 2019-08-20

## 2019-08-20 NOTE — TELEPHONE ENCOUNTER
Pharmacy called in stating that the Bactroban Nasal Ointment is out and they do not have it but is wondering if they can change it to the Regular ointment. Please advise.

## 2019-08-20 NOTE — TELEPHONE ENCOUNTER
I discussed results with patient and advised decontamination protocol with intranasal Bactroban, Hibiclens bath and Sivextro.

## 2019-08-21 ENCOUNTER — TELEPHONE (OUTPATIENT)
Dept: INTERNAL MEDICINE CLINIC | Age: 79
End: 2019-08-21

## 2019-08-21 DIAGNOSIS — Z22.322 MRSA CARRIER: ICD-10-CM

## 2019-09-03 ENCOUNTER — TELEPHONE (OUTPATIENT)
Dept: INTERNAL MEDICINE CLINIC | Age: 79
End: 2019-09-03

## 2024-12-31 NOTE — PATIENT INSTRUCTIONS
AFIB>>?change from [facility], pursue catheter ablation    Liver dysfunction>>?related to Multaq, check liver ultrasound    PMR>>steroid through Dr. Muñiz>>?  Reason why she is not on ulcer prophylaxis    Asthma stable    Depression>>caution regarding Trazodone with Multaq QT prolongation    Osteoporosis>>IV Reclast, D3 supplements    Lung Cancer resected>>annual CT
show